# Patient Record
Sex: MALE | Race: OTHER | URBAN - METROPOLITAN AREA
[De-identification: names, ages, dates, MRNs, and addresses within clinical notes are randomized per-mention and may not be internally consistent; named-entity substitution may affect disease eponyms.]

---

## 2017-08-31 PROBLEM — K57.92 DIVERTICULITIS: Status: ACTIVE | Noted: 2017-08-31

## 2017-08-31 PROBLEM — E66.01 MORBID OBESITY WITH BMI OF 40.0-44.9, ADULT (HCC): Status: ACTIVE | Noted: 2017-08-31

## 2017-09-14 ENCOUNTER — OFFICE VISIT (OUTPATIENT)
Dept: SURGERY | Age: 32
End: 2017-09-14

## 2017-09-14 VITALS
WEIGHT: 315 LBS | HEIGHT: 71 IN | OXYGEN SATURATION: 100 % | TEMPERATURE: 98.3 F | BODY MASS INDEX: 44.1 KG/M2 | SYSTOLIC BLOOD PRESSURE: 118 MMHG | DIASTOLIC BLOOD PRESSURE: 86 MMHG | HEART RATE: 102 BPM

## 2017-09-14 DIAGNOSIS — E66.01 MORBID OBESITY WITH BMI OF 40.0-44.9, ADULT (HCC): ICD-10-CM

## 2017-09-14 DIAGNOSIS — K57.20 DIVERTICULITIS OF LARGE INTESTINE WITH PERFORATION WITHOUT BLEEDING: Primary | ICD-10-CM

## 2017-09-14 PROCEDURE — 99214 OFFICE O/P EST MOD 30 MIN: CPT | Performed by: SURGERY

## 2017-11-21 ENCOUNTER — OFFICE VISIT (OUTPATIENT)
Dept: SURGERY | Age: 32
End: 2017-11-21

## 2017-11-21 VITALS
OXYGEN SATURATION: 100 % | WEIGHT: 315 LBS | BODY MASS INDEX: 44.21 KG/M2 | DIASTOLIC BLOOD PRESSURE: 101 MMHG | SYSTOLIC BLOOD PRESSURE: 146 MMHG | TEMPERATURE: 98.3 F | HEART RATE: 73 BPM

## 2017-11-21 DIAGNOSIS — K57.20 DIVERTICULITIS OF LARGE INTESTINE WITH PERFORATION WITHOUT BLEEDING: ICD-10-CM

## 2017-11-21 DIAGNOSIS — K57.20 DIVERTICULITIS OF LARGE INTESTINE WITH PERFORATION WITHOUT BLEEDING: Primary | ICD-10-CM

## 2017-11-21 DIAGNOSIS — E66.01 MORBID OBESITY WITH BMI OF 40.0-44.9, ADULT (HCC): Primary | ICD-10-CM

## 2017-11-21 PROCEDURE — 99213 OFFICE O/P EST LOW 20 MIN: CPT | Performed by: SURGERY

## 2017-11-21 NOTE — PROGRESS NOTES
Madeline Palma is here for follow up after colonoscopy. He had perforated sigmoid colon diverticulitis managed conservatively. He is doing very well now. No pain. Tolerating diet and ambulating well. No other complaints. Review of systems is otherwise negative    Past medical history, Past surgical history, Social history, Family history and allergies reviewed and updated. Vitals:    11/21/17 0926   BP: (!) 146/101   Pulse: 73   Temp: 98.3 °F (36.8 °C)   TempSrc: Oral   SpO2: 100%   Weight: (!) 317 lb (143.8 kg)       O/E:   Constitutional: Patient is oriented to person, place, and time. No distress. HENT: mucus membranes - moist. No scleral icterus. Neck: Supple and normal range of motion. No lymphadenopathy present. Pulmonary/Chest: Effort normal. No respiratory distress. Abdominal: Obese, Soft. No distension and no mass. No tenderness. No Hepatosplenomegaly. Extremities: no edema and no tenderness. Neurological: Grossly intact motor and sensory exam  Skin: Skin is warm and dry. No rash noted. He is not diaphoretic. Psychiatric: He has a normal mood and affect. His behavior is normal. Judgment and thought content normal.     A/P:   1. Morbid obesity with BMI of 40.0-44.9, adult ([address]     2. Diverticulitis of large intestine with perforation without bleeding       Complicated diverticulitis managed conservatively, overall doing well. Colonoscopy findings reviewed. Recommended elective sigmoid colectomy. He is committed to quit smoking and loose weight - counseling given  PCP and weight loss program referral.  Follow up if any symptoms or when wants to have surgery  I spent more than 15 minutes face to face time with patient.      Giana Kitchen MD   Surgical Oncologist / General Surgeon

## 2017-12-04 ENCOUNTER — TELEPHONE (OUTPATIENT)
Dept: INTERNAL MEDICINE | Age: 32
End: 2017-12-04